# Patient Record
Sex: FEMALE | Race: BLACK OR AFRICAN AMERICAN | NOT HISPANIC OR LATINO | Employment: OTHER | URBAN - METROPOLITAN AREA
[De-identification: names, ages, dates, MRNs, and addresses within clinical notes are randomized per-mention and may not be internally consistent; named-entity substitution may affect disease eponyms.]

---

## 2020-02-20 PROBLEM — I48.92 ATRIAL FLUTTER: Status: ACTIVE | Noted: 2020-02-20

## 2020-02-20 PROBLEM — Z51.81 ENCOUNTER FOR MONITORING SOTALOL THERAPY: Status: ACTIVE | Noted: 2020-02-20

## 2020-02-20 PROBLEM — Z79.899 ENCOUNTER FOR MONITORING SOTALOL THERAPY: Status: ACTIVE | Noted: 2020-02-20

## 2020-02-20 PROBLEM — E78.5 HYPERLIPIDEMIA: Status: ACTIVE | Noted: 2020-02-20

## 2020-02-20 PROBLEM — I10 ESSENTIAL HYPERTENSION: Status: ACTIVE | Noted: 2020-02-20

## 2020-02-20 NOTE — PROGRESS NOTES
Subjective:     HPI    Cardiologist: Charly Pelaez MD, Vickie Campbell NP    I had the pleasure of seeing Rosalina Rendon in consultation at your request for the evaluation of atrial flutter. She is a 69 year old female with a history of HTN, HLD, and CAD status-post CABG in 2011, who was incidentally noted to be in atrial flutter at a 1/2020 cardiology office visit. This prompted a 24 hour Holter monitor, which showed a 100% atrial flutter burden. She was stared on Xarelto and Sotalol around this time. Rates are controlled, and she is relatively asymptomatic with this arrhythmia. An echo was performed earlier today--results are pending.    Ms. Rendon has noted worsening SANCHEZ over the past month.    I reviewed an ECG dated 1/22/2020 which shows atrial flutter with variable AV conduction.    Review of Systems   Constitution: Negative for decreased appetite, malaise/fatigue, weight gain and weight loss.   HENT: Negative for sore throat.    Eyes: Negative for blurred vision.   Cardiovascular: Negative for chest pain, dyspnea on exertion, irregular heartbeat, leg swelling, near-syncope, orthopnea, palpitations, paroxysmal nocturnal dyspnea and syncope.   Respiratory: Negative for shortness of breath.    Skin: Negative for rash.   Musculoskeletal: Negative for arthritis.   Gastrointestinal: Negative for abdominal pain.   Neurological: Negative for focal weakness.   Psychiatric/Behavioral: Negative for altered mental status.        Objective:    Physical Exam   Constitutional: She is oriented to person, place, and time. She appears well-developed and well-nourished. No distress.   HENT:   Head: Normocephalic and atraumatic.   Mouth/Throat: Oropharynx is clear and moist.   Eyes: Pupils are equal, round, and reactive to light. Conjunctivae are normal. No scleral icterus.   Neck: Normal range of motion. Neck supple. No JVD present. No thyromegaly present.   Cardiovascular: Normal rate, regular rhythm, normal heart sounds and  intact distal pulses. Exam reveals no gallop and no friction rub.   No murmur heard.  Pulmonary/Chest: Effort normal and breath sounds normal. No respiratory distress.   Abdominal: Soft. Bowel sounds are normal. She exhibits no distension.   Musculoskeletal: She exhibits no edema.   Neurological: She is alert and oriented to person, place, and time.   Skin: Skin is warm and dry.   Psychiatric: She has a normal mood and affect. Her behavior is normal.   Vitals reviewed.        Assessment:       1. Atrial flutter, unspecified type    2. Encounter for monitoring sotalol therapy    3. Essential hypertension    4. Hyperlipidemia, unspecified hyperlipidemia type         Plan:       In summary, Rosalina Rendon is a 69 year old female with a history of atrial flutter. Her [id number]-GKNp Score is 4 (age, female, HTN, CAD) so she should continue on Xarelto for now.    We discussed the risks, benefits, indications, and alternatives to atrial flutter ablation. After considering her options she has agreed to proceed. Should AFL by atypical at the time of ablation, a cardioversion will be performed.    CARTO. Do not hold Xarelto. IVY--cancel if sinus. Post-ablation will stop Sotalol, and will plan on stopping Xarelto 4 weeks post-ablation.    Thank you for allowing me to participate in the care of this patient. Please do not hesitate to call me with any questions or concerns.

## 2020-02-21 ENCOUNTER — OFFICE VISIT (OUTPATIENT)
Dept: CARDIOLOGY | Facility: CLINIC | Age: 70
End: 2020-02-21
Payer: MEDICARE

## 2020-02-21 DIAGNOSIS — I10 ESSENTIAL HYPERTENSION: ICD-10-CM

## 2020-02-21 DIAGNOSIS — I48.92 ATRIAL FLUTTER, UNSPECIFIED TYPE: ICD-10-CM

## 2020-02-21 DIAGNOSIS — Z51.81 ENCOUNTER FOR MONITORING SOTALOL THERAPY: ICD-10-CM

## 2020-02-21 DIAGNOSIS — E78.5 HYPERLIPIDEMIA, UNSPECIFIED HYPERLIPIDEMIA TYPE: ICD-10-CM

## 2020-02-21 DIAGNOSIS — Z79.899 ENCOUNTER FOR MONITORING SOTALOL THERAPY: ICD-10-CM

## 2020-02-21 PROCEDURE — 99205 PR OFFICE/OUTPT VISIT, NEW, LEVL V, 60-74 MIN: ICD-10-PCS | Mod: ,,, | Performed by: INTERNAL MEDICINE

## 2020-02-21 PROCEDURE — 1159F PR MEDICATION LIST DOCUMENTED IN MEDICAL RECORD: ICD-10-PCS | Mod: ,,, | Performed by: INTERNAL MEDICINE

## 2020-02-21 PROCEDURE — 99205 OFFICE O/P NEW HI 60 MIN: CPT | Mod: ,,, | Performed by: INTERNAL MEDICINE

## 2020-02-21 PROCEDURE — 1159F MED LIST DOCD IN RCRD: CPT | Mod: ,,, | Performed by: INTERNAL MEDICINE

## 2020-02-27 ENCOUNTER — TELEPHONE (OUTPATIENT)
Dept: ELECTROPHYSIOLOGY | Facility: CLINIC | Age: 70
End: 2020-02-27

## 2020-02-27 NOTE — TELEPHONE ENCOUNTER
Returned call to pt. AFL procedure scheduled for 4/29/2020.        ----- Message from Danna Perez MA sent at 2/27/2020  3:34 PM CST -----  Contact: patient call  The patient would like to talk to the nurse about her procedure that was need to be schedule . Please call [phone]. Thank you.

## 2020-02-28 DIAGNOSIS — Z01.89 ENCOUNTER FOR OTHER SPECIFIED SPECIAL EXAMINATIONS: ICD-10-CM

## 2020-02-28 DIAGNOSIS — I49.9 CARDIAC ARRHYTHMIA, UNSPECIFIED CARDIAC ARRHYTHMIA TYPE: ICD-10-CM

## 2020-02-28 DIAGNOSIS — I48.92 ATRIAL FLUTTER, UNSPECIFIED TYPE: Primary | ICD-10-CM

## 2020-02-28 NOTE — PROGRESS NOTES
Patient Instructions  Cardiac Ablation    Pre-Procedure Testin2020 at 10 AM   Pre-procedure labs have been ordered for you at:  [facility]  · Be sure to arrive at your scheduled time.   · You do NOT need to fast for this blood work.       Important Medication Information:    · HOLD (do NOT take) INSULIN on the day of your procedure.  Your last dose should be taken on the evening of 2020.  · Night prior to procedure take INSULIN as directed. If INSULIN taken at bedtime take 1/2 the prescribed dose.   · You may take all other morning medications with a sip of water on the day of your procedure.       Day of Procedure:    2020 at 9 AM  IVY / CARDIAC ABLATION  Please report to Cardiology Waiting Room on the 3rd floor of the Hospital    · Do not eat or drink anything after 12 midnight on the night before your procedure.  · Please do not wear makeup, especially mascara, when arriving for your procedure.  · You will be GOING HOME AFTER YOUR PROCEDURE  · You will need someone to drive you home from the hospital due to anesthesia.   · You are allowed one guest to stay with you if you are scheduled to stay over night.  There is a pull out chair or sofa in each room for your guest to sleep.  · If you wear a CPAP or BIPAP machine for sleep apnea, please be sure to bring your machine with you if you are scheduled to spend the night.        Directions to Cardiology Waiting Room  If you park in the Parking Garage:  Take elevators to the 2nd floor  Walk up ramp and turn right by Gold Elevators  Take elevator to the 3rd floor  Upon exiting the elevator, turn away from the clinic areas  Walk long garcia around to front of hospital to area with windows overlooking [facility]  Check in at Reception Desk  OR  If family is dropping you off:  Have them drop you off at the front of the Hospital  (Near the ER, where all the flags are hung).  Take the E elevators to the 3rd  floor.  Check in at the Reception Desk in the waiting room.      Post Ablation Instructions:     No pushing, pulling, or lifting greater than 5 pounds for ONE week.   No long car rides (greater than 1 hour) for ONE week.  If a long car ride is required, we ask that you stop every hour and walk around for a few minutes.    No driving for 24 hours after procedure due to anesthesia.   No sitting in water for ONE week (this includes baths, pools, and hot tubs).    It is ok to go up and down home stairs as needed.    Your groin site(s) may be tender and have some bruising.  This is normal.  If the area feels hard, or if there is any pain or swelling at the site(s), please call our office IMMEDIATELY at [phone].        Any need to reschedule or cancel procedures, or any questions regarding your procedures should be addressed directly with the Arrhythmia Department Nurses at the following phone number: [phone].

## 2020-04-14 ENCOUNTER — TELEPHONE (OUTPATIENT)
Dept: ELECTROPHYSIOLOGY | Facility: CLINIC | Age: 70
End: 2020-04-14

## 2020-04-14 NOTE — TELEPHONE ENCOUNTER
Spoke with patient regarding rescheduling non-emergent case in accordance with [facility] guidance due to the COVID-19 outbreak. Informed patient our office would be in contact to reschedule once cleared to do so.  Patient verbalized full understanding and will call our office with any questions or concerns in the meantime.

## 2020-05-29 ENCOUNTER — TELEPHONE (OUTPATIENT)
Dept: ELECTROPHYSIOLOGY | Facility: CLINIC | Age: 70
End: 2020-05-29

## 2020-05-29 NOTE — TELEPHONE ENCOUNTER
Attempted to reach pt to discuss rescheduling AFL RFA., no answer.  Left message with number for pt to return call to discuss dates.

## 2020-06-04 ENCOUNTER — TELEPHONE (OUTPATIENT)
Dept: ELECTROPHYSIOLOGY | Facility: CLINIC | Age: 70
End: 2020-06-04

## 2020-06-04 NOTE — TELEPHONE ENCOUNTER
Attempted to reach pt to reschedule afl ablation, no answer. Left voice message with number for pt to return call.

## 2020-06-24 ENCOUNTER — TELEPHONE (OUTPATIENT)
Dept: ELECTROPHYSIOLOGY | Facility: CLINIC | Age: 70
End: 2020-06-24

## 2020-06-24 NOTE — TELEPHONE ENCOUNTER
Pt return call on 6/10/2020 and AFL ablation reschedule for 7/31/2020. Discussed new visitor policy with patient and she agrees to proceed. Pt denies any fever, chills, cough, loss of appetite and nausea at this time.

## 2020-07-01 ENCOUNTER — TELEPHONE (OUTPATIENT)
Dept: ELECTROPHYSIOLOGY | Facility: CLINIC | Age: 70
End: 2020-07-01

## 2020-07-01 DIAGNOSIS — Z01.818 PRE-OP TESTING: ICD-10-CM

## 2020-07-01 RX ORDER — SOTALOL HYDROCHLORIDE 80 MG/1
80 TABLET ORAL 2 TIMES DAILY
Status: ON HOLD | COMMUNITY
Start: 2020-06-26 | End: 2020-10-22 | Stop reason: HOSPADM

## 2020-07-01 RX ORDER — ROSUVASTATIN CALCIUM 40 MG/1
40 TABLET, COATED ORAL DAILY
COMMUNITY
Start: 2020-03-25

## 2020-07-01 RX ORDER — CETIRIZINE HYDROCHLORIDE 10 MG/1
10 TABLET ORAL NIGHTLY
COMMUNITY
Start: 2020-06-26

## 2020-07-01 RX ORDER — RIVAROXABAN 20 MG/1
20 TABLET, FILM COATED ORAL DAILY
Status: ON HOLD | COMMUNITY
Start: 2020-06-26 | End: 2020-10-22 | Stop reason: SDUPTHER

## 2020-07-01 RX ORDER — FUROSEMIDE 40 MG/1
40 TABLET ORAL DAILY
COMMUNITY
Start: 2020-04-24

## 2020-07-01 RX ORDER — INSULIN ASPART 100 [IU]/ML
INJECTION, SOLUTION INTRAVENOUS; SUBCUTANEOUS
COMMUNITY
Start: 2020-05-28

## 2020-07-01 RX ORDER — ASPIRIN 81 MG/1
81 TABLET ORAL DAILY
COMMUNITY
Start: 2014-09-16

## 2020-07-01 RX ORDER — LISINOPRIL 10 MG/1
10 TABLET ORAL DAILY
COMMUNITY
Start: 2020-03-25

## 2020-07-01 RX ORDER — POTASSIUM CHLORIDE 750 MG/1
10 CAPSULE, EXTENDED RELEASE ORAL DAILY
COMMUNITY
Start: 2020-04-24

## 2020-07-01 RX ORDER — INSULIN GLARGINE 100 [IU]/ML
3 INJECTION, SOLUTION SUBCUTANEOUS SEE ADMIN INSTRUCTIONS
COMMUNITY
Start: 2020-06-14

## 2020-07-01 NOTE — PROGRESS NOTES
Patient Instructions  Cardiac Ablation    Pre-Procedure Testin/25/2020- Last dose of Sotalol. Hold 5 days prior to procedure.       2020 at 10 AM   Pre-procedure labs have been ordered for you at:  [facility]  · You do NOT need to fast for this blood work.       Important Medication Information:    · HOLD (do NOT take) INSULIN on the day of your procedure.  Your last dose should be taken on 2020.             -Night prior to procedure if INSULIN taken at bedtime take 1/2 the dose. If Insulin taken with evening meal take as directed    · HOLD (do NOT take) SOTALOL 5 days prior to your procedure.  Your last dose should be taken on 2020.    · You may take all other morning medications with a sip of water on the day of your procedure.       Day of Procedure:    2020 at 9 AM  IVY/ Cardiac Ablation/ Cardioversion  Please report to Cardiology Waiting Room on the 3rd floor of the Hospital    · Do not eat or drink anything after 12 midnight on the night before your procedure.  · Please do not wear makeup, especially mascara, when arriving for your procedure.  · You will be GOING HOME AFTER YOUR PROCEDURE  · You will need someone to drive you home from the hospital due to anesthesia.     Directions to Cardiology Waiting Room  If you park in the Parking Garage:  Take elevators to the 2nd floor  Walk up ramp and turn right by Gold Elevators  Take elevator to the 3rd floor  Upon exiting the elevator, turn away from the clinic areas  Walk long garcia around to front of hospital to area with windows overlooking [facility]  Check in at Reception Desk  OR  If family is dropping you off:  Have them drop you off at the front of the Hospital  (Near the ER, where all the flags are hung).  Take the E elevators to the 3rd floor.  Check in at the Reception Desk in the waiting room.      · All patients/visitors are asked to arrive with a mask and keep the mask on  throughout their visit  · All persons entering will be screened for fever and respiratory symptoms  · Patients may have one adult support person pre and post procedure  · Support person may remain with the patient prior to their procedure and then must wait in a socially distant manner until a member of the medical team provides an update at the conclusion of the procedure  · If you are spending the night, your support person must follow the visitation hours (10am-6pm)  · Your support person should provide the staff with a valid cell phone number so that he or she can receive automated updates        Post Ablation Instructions:     No pushing, pulling, or lifting greater than 5 pounds for ONE week.   No long car rides (greater than 1 hour) for ONE week.  If a long car ride is required, we ask that you stop every hour and walk around for a few minutes.    No driving for 24 hours after procedure due to anesthesia.   No sitting in water for ONE week (this includes baths, pools, and hot tubs).    It is ok to go up and down home stairs as needed.    Your groin site(s) may be tender and have some bruising.  This is normal.  If the area feels hard, or if there is any pain or swelling at the site(s), please call our office IMMEDIATELY at [phone].        Any need to reschedule or cancel procedures, or any questions regarding your procedures should be addressed directly with the Arrhythmia Department Nurses at the following phone number: [phone].

## 2020-07-31 ENCOUNTER — TELEPHONE (OUTPATIENT)
Dept: ELECTROPHYSIOLOGY | Facility: CLINIC | Age: 70
End: 2020-07-31

## 2020-08-20 ENCOUNTER — TELEPHONE (OUTPATIENT)
Dept: ELECTROPHYSIOLOGY | Facility: CLINIC | Age: 70
End: 2020-08-20

## 2020-08-20 NOTE — TELEPHONE ENCOUNTER
Returned call to pt's daughter. Procedure scheduled for 10/22/2020.          ----- Message from Patrick Gant MA sent at 8/20/2020  2:52 PM CDT -----  Regarding: FW: schedule procedure  Contact: patient's daughter Lauren    ----- Message -----  From: Sherley Reyes  Sent: 8/20/2020   2:47 PM CDT  To: Radha Junior Staff  Subject: schedule procedure                               The pt's daughter is calling to schedule her procedure. Please call her back @ [phone]. Thanks, Sherley

## 2020-09-03 DIAGNOSIS — I48.92 ATRIAL FLUTTER, UNSPECIFIED TYPE: Primary | ICD-10-CM

## 2020-09-03 DIAGNOSIS — I49.9 CARDIAC ARRHYTHMIA, UNSPECIFIED CARDIAC ARRHYTHMIA TYPE: ICD-10-CM

## 2020-09-03 DIAGNOSIS — Z01.89 ENCOUNTER FOR OTHER SPECIFIED SPECIAL EXAMINATIONS: ICD-10-CM

## 2020-10-08 DIAGNOSIS — I49.9 CARDIAC ARRHYTHMIA, UNSPECIFIED CARDIAC ARRHYTHMIA TYPE: ICD-10-CM

## 2020-10-08 DIAGNOSIS — I48.92 ATRIAL FLUTTER, UNSPECIFIED TYPE: Primary | ICD-10-CM

## 2020-10-08 NOTE — PROGRESS NOTES
Patient Instructions  Cardiac Ablation    Pre-Procedure Testing:       10/15/2020 at 10:00 AM  Blood Work has been scheduled for you at: [facility]   You do NOT need to fast for this blood work.     10/16/2020- Last dose of SOTALOL prior to procedure.       Important Medication Information:    · HOLD (do NOT take) INSULIN on the day of your procedure.  Your last dose should be taken on 10/21/2020.  · Night prior to procedure if INSULIN taken with evening meal take as directed. If INSULIN taken at bedtime take 1/2 the prescribed dose.  · HOLD (do NOT take) SOTALOL 5 days prior to your procedure.  Your last dose should be taken on 10/16/2020.  · Night prior to procedure take Xarelto as prescribed with evening meal.  · You may take all other morning medications with a sip of water on the day of your procedure.       **Prior to your procedure please check your skin and groin area thoroughly for any signs or symptoms of infection such as rashes, open sores, yeast infection, or heat rashes.  If you have any concerns with your skin you should be evaluated by your Primary Care Provider or an Urgent Care for treatment prior to your procedure. Your procedure will be cancelled if not treated prior to the day of your procedure.**      Day of Procedure:    10/22/2020 at 9:00 AM - IVY / Cardiac Ablation     [facility] - [address] [place] [id number]  Please report to Cardiology Waiting Room on the 3rd floor of the Hospital,     · Do not eat or drink anything after 12 midnight on the night before your procedure.  · Please follow important medication instructions listed above.   · Please do not wear makeup, especially mascara, when arriving for your procedure.  · You will be GOING HOME AFTER YOUR PROCEDURE        You will need someone to drive you home from the hospital due to anesthesia.        Directions to Cardiology Waiting Room  If you park in the Parking Garage:  Take elevators  to the 2nd floor  Walk up ramp and turn right by Gold Elevators  Take elevator to the 3rd floor  Upon exiting the elevator, turn away from the clinic areas  Walk long garcia around to front of hospital to area with windows overlooking [facility]  Check in at Reception Desk  OR  If family is dropping you off:  Have them drop you off at the front of the Hospital  (Near the ER, where all the flags are hung).  Take the E elevators to the 3rd floor.  Check in at the Reception Desk in the waiting room.        · All patients/visitors are asked to arrive with a mask and keep the mask on throughout their visit  · All persons entering will be screened for fever and respiratory symptoms  · Patients may have one adult support person pre and post procedure  · Support person may remain with the patient prior to their procedure and then must wait in a socially distant manner until a member of the medical team provides an update at the conclusion of the procedure  · If you are spending the night, your support person must follow the visitation hours (8am-6pm)  · Your support person should provide the staff with a valid cell phone number so that he or she can receive automated updates      Post Ablation Instructions:     No pushing, pulling, or lifting greater than 5 pounds for ONE week.   No long car rides (greater than 1 hour) for ONE week.  If a long car ride is required, we ask that you stop every hour and walk around for a few minutes.    No driving for 24 hours after procedure due to anesthesia.   No sitting in water for ONE week (this includes baths, pools, and hot tubs).    It is ok to go up and down home stairs as needed.    Your groin site(s) may be tender and have some bruising.  This is normal.  A small lump less than the size of a quarter or a marble is normal and can last a couple of weeks.   If you notice any swelling, bleeding, or increase in the size of this lump please call us at [phone].         Any need to reschedule or cancel procedures, or any questions regarding your procedures should be addressed directly with the Arrhythmia Department Nurses at the following phone number: [phone].

## 2020-10-21 ENCOUNTER — TELEPHONE (OUTPATIENT)
Dept: ELECTROPHYSIOLOGY | Facility: CLINIC | Age: 70
End: 2020-10-21

## 2020-10-21 NOTE — TELEPHONE ENCOUNTER
Spoke to Ms. Rendon    CONFIRMED procedure arrival time of 9AM    Reiterated instructions including:  -Directions to check in desk  -NPO after midnight night prior to procedure  -High importance of HOLDING Insulin day of procedure, Sotalol last dose 10/16   -Confirmed compliance of Xarelto, patient confirms takes with evening meal  -Pre-procedure LABS done, no alerts  -COVID test on admit  -Confirmed no fever, cough, or shortness of breath in the past 30 days  -Confirmed no redness, rash, irritation, or yeast infection to groin area.   -Reviewed current visitor policy    Pt verbalizes understanding of above and appreciates call.

## 2020-10-22 ENCOUNTER — HOSPITAL ENCOUNTER (OUTPATIENT)
Dept: CARDIOLOGY | Facility: HOSPITAL | Age: 70
Discharge: HOME | End: 2020-10-22
Attending: INTERNAL MEDICINE
Payer: MEDICARE

## 2020-10-22 ENCOUNTER — HOSPITAL ENCOUNTER (OUTPATIENT)
Facility: HOSPITAL | Age: 70
Discharge: HOME | End: 2020-10-22
Attending: INTERNAL MEDICINE | Admitting: INTERNAL MEDICINE
Payer: MEDICARE

## 2020-10-22 ENCOUNTER — ANESTHESIA EVENT (OUTPATIENT)
Dept: MEDSURG UNIT | Facility: HOSPITAL | Age: 70
End: 2020-10-22
Payer: MEDICARE

## 2020-10-22 ENCOUNTER — ANESTHESIA (OUTPATIENT)
Dept: MEDSURG UNIT | Facility: HOSPITAL | Age: 70
End: 2020-10-22
Payer: MEDICARE

## 2020-10-22 VITALS
HEIGHT: 62 IN | WEIGHT: 230 LBS | BODY MASS INDEX: 42.33 KG/M2 | DIASTOLIC BLOOD PRESSURE: 100 MMHG | SYSTOLIC BLOOD PRESSURE: 145 MMHG

## 2020-10-22 VITALS
SYSTOLIC BLOOD PRESSURE: 138 MMHG | BODY MASS INDEX: 42.33 KG/M2 | HEART RATE: 63 BPM | OXYGEN SATURATION: 95 % | TEMPERATURE: 98 F | RESPIRATION RATE: 23 BRPM | HEIGHT: 62 IN | WEIGHT: 230 LBS | DIASTOLIC BLOOD PRESSURE: 65 MMHG

## 2020-10-22 DIAGNOSIS — I48.92 ATRIAL FLUTTER, UNSPECIFIED TYPE: ICD-10-CM

## 2020-10-22 DIAGNOSIS — Z01.89 ENCOUNTER FOR OTHER SPECIFIED SPECIAL EXAMINATIONS: ICD-10-CM

## 2020-10-22 DIAGNOSIS — I48.3 TYPICAL ATRIAL FLUTTER: ICD-10-CM

## 2020-10-22 DIAGNOSIS — I49.9 ARRHYTHMIA: ICD-10-CM

## 2020-10-22 LAB
POCT GLUCOSE: 195 MG/DL (ref 70–110)
POCT GLUCOSE: 198 MG/DL (ref 70–110)
POCT GLUCOSE: 214 MG/DL (ref 70–110)
SARS-COV-2 RDRP RESP QL NAA+PROBE: NEGATIVE

## 2020-10-22 PROCEDURE — D9220A PRA ANESTHESIA: ICD-10-PCS | Mod: CRNA,,, | Performed by: STUDENT IN AN ORGANIZED HEALTH CARE EDUCATION/TRAINING PROGRAM

## 2020-10-22 PROCEDURE — 93621 COMP EP EVL L PAC&REC C SINS: CPT | Mod: 26,,, | Performed by: INTERNAL MEDICINE

## 2020-10-22 PROCEDURE — C1732 CATH, EP, DIAG/ABL, 3D/VECT: HCPCS | Performed by: INTERNAL MEDICINE

## 2020-10-22 PROCEDURE — 93005 ELECTROCARDIOGRAM TRACING: CPT | Mod: 59

## 2020-10-22 PROCEDURE — 93320 TRANSESOPHAGEAL ECHO (TEE) (CUPID ONLY): ICD-10-PCS | Mod: 26,,, | Performed by: INTERNAL MEDICINE

## 2020-10-22 PROCEDURE — 93325 TRANSESOPHAGEAL ECHO (TEE) (CUPID ONLY): ICD-10-PCS | Mod: 26,,, | Performed by: INTERNAL MEDICINE

## 2020-10-22 PROCEDURE — 82962 GLUCOSE BLOOD TEST: CPT | Performed by: INTERNAL MEDICINE

## 2020-10-22 PROCEDURE — D9220A PRA ANESTHESIA: ICD-10-PCS | Mod: ANES,,, | Performed by: ANESTHESIOLOGY

## 2020-10-22 PROCEDURE — 27201423 OPTIME MED/SURG SUP & DEVICES STERILE SUPPLY: Performed by: INTERNAL MEDICINE

## 2020-10-22 PROCEDURE — 93325 DOPPLER ECHO COLOR FLOW MAPG: CPT

## 2020-10-22 PROCEDURE — 93613 INTRACARDIAC EPHYS 3D MAPG: CPT | Mod: ,,, | Performed by: INTERNAL MEDICINE

## 2020-10-22 PROCEDURE — D9220A PRA ANESTHESIA: Mod: CRNA,,, | Performed by: STUDENT IN AN ORGANIZED HEALTH CARE EDUCATION/TRAINING PROGRAM

## 2020-10-22 PROCEDURE — 93653 PR ELECTROPHYS EVAL, COMPREHEN, W/SUPRAVENT TACHYCARD TRMT: ICD-10-PCS | Mod: ,,, | Performed by: INTERNAL MEDICINE

## 2020-10-22 PROCEDURE — 93621: ICD-10-PCS | Mod: 26,,, | Performed by: INTERNAL MEDICINE

## 2020-10-22 PROCEDURE — 37000009 HC ANESTHESIA EA ADD 15 MINS: Performed by: INTERNAL MEDICINE

## 2020-10-22 PROCEDURE — 93010 ELECTROCARDIOGRAM REPORT: CPT | Mod: 76,,, | Performed by: INTERNAL MEDICINE

## 2020-10-22 PROCEDURE — 37000008 HC ANESTHESIA 1ST 15 MINUTES: Performed by: INTERNAL MEDICINE

## 2020-10-22 PROCEDURE — 25000003 PHARM REV CODE 250: Performed by: INTERNAL MEDICINE

## 2020-10-22 PROCEDURE — 93613 INTRACARDIAC EPHYS 3D MAPG: CPT | Performed by: INTERNAL MEDICINE

## 2020-10-22 PROCEDURE — 93320 DOPPLER ECHO COMPLETE: CPT | Mod: 26,,, | Performed by: INTERNAL MEDICINE

## 2020-10-22 PROCEDURE — 63600175 PHARM REV CODE 636 W HCPCS: Performed by: INTERNAL MEDICINE

## 2020-10-22 PROCEDURE — A4216 STERILE WATER/SALINE, 10 ML: HCPCS | Performed by: STUDENT IN AN ORGANIZED HEALTH CARE EDUCATION/TRAINING PROGRAM

## 2020-10-22 PROCEDURE — 93653 COMPRE EP EVAL TX SVT: CPT | Performed by: INTERNAL MEDICINE

## 2020-10-22 PROCEDURE — D9220A PRA ANESTHESIA: Mod: ANES,,, | Performed by: ANESTHESIOLOGY

## 2020-10-22 PROCEDURE — C1730 CATH, EP, 19 OR FEW ELECT: HCPCS | Performed by: INTERNAL MEDICINE

## 2020-10-22 PROCEDURE — 25000003 PHARM REV CODE 250: Performed by: STUDENT IN AN ORGANIZED HEALTH CARE EDUCATION/TRAINING PROGRAM

## 2020-10-22 PROCEDURE — 93312 ECHO TRANSESOPHAGEAL: CPT | Mod: 26,,, | Performed by: INTERNAL MEDICINE

## 2020-10-22 PROCEDURE — 93653 COMPRE EP EVAL TX SVT: CPT | Mod: ,,, | Performed by: INTERNAL MEDICINE

## 2020-10-22 PROCEDURE — 63600175 PHARM REV CODE 636 W HCPCS: Performed by: STUDENT IN AN ORGANIZED HEALTH CARE EDUCATION/TRAINING PROGRAM

## 2020-10-22 PROCEDURE — 93613 PR INTRACARD ELECTROPHYS 3-DIMENS MAPPING: ICD-10-PCS | Mod: ,,, | Performed by: INTERNAL MEDICINE

## 2020-10-22 PROCEDURE — 93325 DOPPLER ECHO COLOR FLOW MAPG: CPT | Mod: 26,,, | Performed by: INTERNAL MEDICINE

## 2020-10-22 PROCEDURE — 93010 EKG 12-LEAD: ICD-10-PCS | Mod: ,,, | Performed by: INTERNAL MEDICINE

## 2020-10-22 PROCEDURE — 93312 TRANSESOPHAGEAL ECHO (TEE) (CUPID ONLY): ICD-10-PCS | Mod: 26,,, | Performed by: INTERNAL MEDICINE

## 2020-10-22 PROCEDURE — 93621 COMP EP EVL L PAC&REC C SINS: CPT | Performed by: INTERNAL MEDICINE

## 2020-10-22 PROCEDURE — U0002 COVID-19 LAB TEST NON-CDC: HCPCS

## 2020-10-22 PROCEDURE — C1894 INTRO/SHEATH, NON-LASER: HCPCS | Performed by: INTERNAL MEDICINE

## 2020-10-22 RX ORDER — LIDOCAINE HYDROCHLORIDE 20 MG/ML
INJECTION INTRAVENOUS
Status: DISCONTINUED | OUTPATIENT
Start: 2020-10-22 | End: 2020-10-22

## 2020-10-22 RX ORDER — ACETAMINOPHEN 325 MG/1
650 TABLET ORAL EVERY 4 HOURS PRN
Status: DISCONTINUED | OUTPATIENT
Start: 2020-10-22 | End: 2020-10-22 | Stop reason: HOSPADM

## 2020-10-22 RX ORDER — FENTANYL CITRATE 50 UG/ML
25 INJECTION, SOLUTION INTRAMUSCULAR; INTRAVENOUS EVERY 5 MIN PRN
Status: DISCONTINUED | OUTPATIENT
Start: 2020-10-22 | End: 2020-10-22

## 2020-10-22 RX ORDER — RIVAROXABAN 20 MG/1
20 TABLET, FILM COATED ORAL DAILY
Qty: 30 TABLET | Refills: 0 | Status: SHIPPED | OUTPATIENT
Start: 2020-10-22 | End: 2020-11-21

## 2020-10-22 RX ORDER — SODIUM CHLORIDE 9 MG/ML
INJECTION, SOLUTION INTRAVENOUS CONTINUOUS PRN
Status: DISCONTINUED | OUTPATIENT
Start: 2020-10-22 | End: 2020-10-22

## 2020-10-22 RX ORDER — DIPHENHYDRAMINE HYDROCHLORIDE 50 MG/ML
25 INJECTION INTRAMUSCULAR; INTRAVENOUS EVERY 6 HOURS PRN
Status: DISCONTINUED | OUTPATIENT
Start: 2020-10-22 | End: 2020-10-22 | Stop reason: HOSPADM

## 2020-10-22 RX ORDER — HEPARIN SODIUM 200 [USP'U]/100ML
INJECTION, SOLUTION INTRAVENOUS
Status: DISCONTINUED | OUTPATIENT
Start: 2020-10-22 | End: 2020-10-22 | Stop reason: HOSPADM

## 2020-10-22 RX ORDER — HYDROMORPHONE HYDROCHLORIDE 1 MG/ML
0.2 INJECTION, SOLUTION INTRAMUSCULAR; INTRAVENOUS; SUBCUTANEOUS EVERY 5 MIN PRN
Status: DISCONTINUED | OUTPATIENT
Start: 2020-10-22 | End: 2020-10-22 | Stop reason: HOSPADM

## 2020-10-22 RX ORDER — FENTANYL CITRATE 50 UG/ML
INJECTION, SOLUTION INTRAMUSCULAR; INTRAVENOUS
Status: DISCONTINUED | OUTPATIENT
Start: 2020-10-22 | End: 2020-10-22

## 2020-10-22 RX ORDER — ONDANSETRON 2 MG/ML
4 INJECTION INTRAMUSCULAR; INTRAVENOUS ONCE AS NEEDED
Status: DISCONTINUED | OUTPATIENT
Start: 2020-10-22 | End: 2020-10-22

## 2020-10-22 RX ORDER — DIPHENHYDRAMINE HYDROCHLORIDE 50 MG/ML
25 INJECTION INTRAMUSCULAR; INTRAVENOUS EVERY 6 HOURS PRN
Status: DISCONTINUED | OUTPATIENT
Start: 2020-10-22 | End: 2020-10-22

## 2020-10-22 RX ORDER — PANTOPRAZOLE SODIUM 40 MG/1
40 TABLET, DELAYED RELEASE ORAL DAILY
Qty: 30 TABLET | Refills: 0 | Status: SHIPPED | OUTPATIENT
Start: 2020-10-22 | End: 2020-11-21

## 2020-10-22 RX ORDER — FENTANYL CITRATE 50 UG/ML
25 INJECTION, SOLUTION INTRAMUSCULAR; INTRAVENOUS EVERY 5 MIN PRN
Status: DISCONTINUED | OUTPATIENT
Start: 2020-10-22 | End: 2020-10-22 | Stop reason: HOSPADM

## 2020-10-22 RX ORDER — LIDOCAINE HYDROCHLORIDE 20 MG/ML
INJECTION, SOLUTION INFILTRATION; PERINEURAL
Status: DISCONTINUED | OUTPATIENT
Start: 2020-10-22 | End: 2020-10-22 | Stop reason: HOSPADM

## 2020-10-22 RX ORDER — PHENYLEPHRINE HYDROCHLORIDE 10 MG/ML
INJECTION INTRAVENOUS
Status: DISCONTINUED | OUTPATIENT
Start: 2020-10-22 | End: 2020-10-22

## 2020-10-22 RX ORDER — PROPOFOL 10 MG/ML
VIAL (ML) INTRAVENOUS CONTINUOUS PRN
Status: DISCONTINUED | OUTPATIENT
Start: 2020-10-22 | End: 2020-10-22

## 2020-10-22 RX ORDER — ONDANSETRON 2 MG/ML
4 INJECTION INTRAMUSCULAR; INTRAVENOUS ONCE AS NEEDED
Status: DISCONTINUED | OUTPATIENT
Start: 2020-10-22 | End: 2020-10-22 | Stop reason: HOSPADM

## 2020-10-22 RX ADMIN — SODIUM CHLORIDE: 0.9 INJECTION, SOLUTION INTRAVENOUS at 12:10

## 2020-10-22 RX ADMIN — FENTANYL CITRATE 25 MCG: 50 INJECTION INTRAMUSCULAR; INTRAVENOUS at 01:10

## 2020-10-22 RX ADMIN — DEXMEDETOMIDINE HYDROCHLORIDE 0.4 MCG/KG/HR: 100 INJECTION, SOLUTION, CONCENTRATE INTRAVENOUS at 12:10

## 2020-10-22 RX ADMIN — PHENYLEPHRINE HYDROCHLORIDE 100 MCG: 10 INJECTION, SOLUTION INTRAMUSCULAR; INTRAVENOUS; SUBCUTANEOUS at 01:10

## 2020-10-22 RX ADMIN — PHENYLEPHRINE HYDROCHLORIDE 150 MCG: 10 INJECTION, SOLUTION INTRAMUSCULAR; INTRAVENOUS; SUBCUTANEOUS at 01:10

## 2020-10-22 RX ADMIN — LIDOCAINE HYDROCHLORIDE 40 MG: 20 INJECTION, SOLUTION INTRAVENOUS at 12:10

## 2020-10-22 RX ADMIN — FENTANYL CITRATE 25 MCG: 50 INJECTION INTRAMUSCULAR; INTRAVENOUS at 12:10

## 2020-10-22 RX ADMIN — PROPOFOL 50 MCG/KG/MIN: 10 INJECTION, EMULSION INTRAVENOUS at 12:10

## 2020-10-22 RX ADMIN — PHENYLEPHRINE HYDROCHLORIDE 50 MCG: 10 INJECTION, SOLUTION INTRAMUSCULAR; INTRAVENOUS; SUBCUTANEOUS at 01:10

## 2020-10-22 NOTE — CONSULTS
[facility] Stay Cardiac Unit  Cardiology  Consult Note    Patient Name: Rosalina Rendon  MRN: [id number]  Admission Date: 10/22/2020  Hospital Length of Stay: 0 days  Code Status: No Order   Attending Provider: Vernon Garcia MD   Consulting Provider: Brody Chavez MD  Primary Care Physician: TINA Sparks  Principal Problem:<principal problem not specified>    Patient information was obtained from patient and relative(s).     Consults  Subjective:     Chief Complaint:  AFL     HPI:   She is a 69 year old female with a history of HTN, HLD, and CAD status-post CABG in 2011, who was incidentally noted to be in atrial flutter at a 1/2020 cardiology office visit. This prompted a 24 hour Holter monitor, which showed a 100% atrial flutter burden. She was stared on Xarelto and Sotalol around this time. No echo on file. Here today for CTI ablation with Dr. Garcia.    ECG: atrial flutter with variable block    Dysphagia or odynophagia:  No  Liver Disease, esophageal disease, or known varices:  No  Upper GI Bleeding: No  Snoring:  No  Sleep Apnea:  No  Prior neck surgery or radiation:  No  History of anesthetic difficulties:  No  Family history of anesthetic difficulties:  No  Last oral intake:  last night before midnight  Able to move neck in all directions:  Yes  Anticoagulation/Antiplatelets: Xarelto  Platelet count: pending  INR: pending      Past Medical History:   Diagnosis Date    Anticoagulant long-term use     Cancer     Diabetes mellitus     Encounter for blood transfusion     Hypertension        Past Surgical History:   Procedure Laterality Date    BREAST SURGERY      CHOLECYSTECTOMY      CORONARY ARTERY BYPASS GRAFT         Review of patient's allergies indicates:   Allergen Reactions    Codeine Hives       No current facility-administered medications on file prior to encounter.      Current Outpatient Medications on File Prior to Encounter   Medication Sig    aspirin (ECOTRIN)  81 MG EC tablet Take 81 mg by mouth once daily.    cetirizine (ZYRTEC) 10 MG tablet Take 10 mg by mouth every evening.    furosemide (LASIX) 40 MG tablet Take 40 mg by mouth once daily.    lisinopriL 10 MG tablet Take 10 mg by mouth once daily.    NOVOLOG U-100 INSULIN ASPART 100 unit/mL injection INJECT 12 UNITS SUBCUTANEOUSLY THREE TIMES DAILY WITH MEALS    potassium chloride (MICRO-K) 10 MEQ CpSR Take 10 mEq by mouth once daily.    rosuvastatin (CRESTOR) 40 MG Tab Take 40 mg by mouth once daily.    XARELTO 20 mg Tab Take 20 mg by mouth once daily. With evening meal    BASAGLAR KWIKPEN U-100 INSULIN glargine 100 units/mL (3mL) SubQ pen Inject 3 mLs into the skin As instructed.    sotaloL (BETAPACE) 80 MG tablet Take 80 mg by mouth 2 (two) times a day.     Family History     None        Tobacco Use    Smoking status: Never Smoker   Substance and Sexual Activity    Alcohol use: Not Currently     Frequency: Never    Drug use: Not Currently    Sexual activity: Not on file     Review of Systems   All other systems reviewed and are negative.    Objective:     Vital Signs (Most Recent):    Vital Signs (24h Range):           There is no height or weight on file to calculate BMI.            No intake or output data in the 24 hours ending 10/22/20 0943    Lines/Drains/Airways     None                 Physical Exam   Constitutional: She appears well-developed and well-nourished. No distress.   HENT:   Head: Normocephalic and atraumatic.   Eyes: EOM are normal. Right eye exhibits no discharge. Left eye exhibits no discharge. No scleral icterus.   Neck: Normal range of motion. Neck supple.   Cardiovascular: Normal rate, S1 normal, S2 normal, normal heart sounds, intact distal pulses and normal pulses. An irregularly irregular rhythm present.  No extrasystoles are present. Exam reveals no gallop, no S3, no S4, no distant heart sounds, no friction rub and no opening snap.   No murmur heard.  Pulses:       Radial  pulses are 2+ on the right side and 2+ on the left side.   Pulmonary/Chest: Effort normal. No respiratory distress. She has no wheezes. She has no rales.   Abdominal: Soft. Bowel sounds are normal. She exhibits no distension. There is no abdominal tenderness.   Musculoskeletal: Normal range of motion.         General: No deformity or edema.   Neurological: She is alert.   Skin: Skin is warm and dry. No rash noted. She is not diaphoretic. No erythema.   Nursing note and vitals reviewed.      Significant Labs: All pertinent lab results from the last 24 hours have been reviewed.    Significant Imaging: Reviewed    Assessment and Plan:     Atrial flutter  Here today for IVY prior to CTI with Dr. Garcia  -No absolute contraindications of esophageal stricture, tumor, perforation, laceration,or diverticulum and/or active GI bleed  -The risks, benefits & alternatives of the procedure were explained to the patient.   -The risks of transesophageal echo include but are not limited to:  Dental trauma, esophageal trauma/perforation, bleeding, laryngospasm/brochospasm, aspiration, sore throat/hoarseness, & dislodgement of the endotracheal tube/nasogastric tube (where applicable).    -The risks of ANES monitored sedation include hypotension, respiratory depression, arrhythmias, bronchospasm, & death.    -Informed consent was obtained. The patient is agreeable to proceed with the procedure and all questions and concerns addressed.    Case discussed with an attending in echocardiography lab.    Further recommendations per attending addendum          VTE Risk Mitigation (From admission, onward)    None          Thank you for your consult. I will follow-up with patient. Please contact us if you have any additional questions.    Brody Chavez MD  Cardiology   [facility]

## 2020-10-22 NOTE — SUBJECTIVE & OBJECTIVE
Past Medical History:   Diagnosis Date    Anticoagulant long-term use     Cancer     Diabetes mellitus     Encounter for blood transfusion     Hypertension        Past Surgical History:   Procedure Laterality Date    BREAST SURGERY      CHOLECYSTECTOMY      CORONARY ARTERY BYPASS GRAFT         Review of patient's allergies indicates:   Allergen Reactions    Codeine Hives       No current facility-administered medications on file prior to encounter.      Current Outpatient Medications on File Prior to Encounter   Medication Sig    aspirin (ECOTRIN) 81 MG EC tablet Take 81 mg by mouth once daily.    cetirizine (ZYRTEC) 10 MG tablet Take 10 mg by mouth every evening.    furosemide (LASIX) 40 MG tablet Take 40 mg by mouth once daily.    lisinopriL 10 MG tablet Take 10 mg by mouth once daily.    NOVOLOG U-100 INSULIN ASPART 100 unit/mL injection INJECT 12 UNITS SUBCUTANEOUSLY THREE TIMES DAILY WITH MEALS    potassium chloride (MICRO-K) 10 MEQ CpSR Take 10 mEq by mouth once daily.    rosuvastatin (CRESTOR) 40 MG Tab Take 40 mg by mouth once daily.    XARELTO 20 mg Tab Take 20 mg by mouth once daily. With evening meal    BASAGLAR KWIKPEN U-100 INSULIN glargine 100 units/mL (3mL) SubQ pen Inject 3 mLs into the skin As instructed.    sotaloL (BETAPACE) 80 MG tablet Take 80 mg by mouth 2 (two) times a day.     Family History     None        Tobacco Use    Smoking status: Never Smoker   Substance and Sexual Activity    Alcohol use: Not Currently     Frequency: Never    Drug use: Not Currently    Sexual activity: Not on file     Review of Systems   All other systems reviewed and are negative.    Objective:     Vital Signs (Most Recent):    Vital Signs (24h Range):           There is no height or weight on file to calculate BMI.            No intake or output data in the 24 hours ending 10/22/20 0943    Lines/Drains/Airways     None                 Physical Exam   Constitutional: She appears well-developed  and well-nourished. No distress.   HENT:   Head: Normocephalic and atraumatic.   Eyes: EOM are normal. Right eye exhibits no discharge. Left eye exhibits no discharge. No scleral icterus.   Neck: Normal range of motion. Neck supple.   Cardiovascular: Normal rate, S1 normal, S2 normal, normal heart sounds, intact distal pulses and normal pulses. An irregularly irregular rhythm present.  No extrasystoles are present. Exam reveals no gallop, no S3, no S4, no distant heart sounds, no friction rub and no opening snap.   No murmur heard.  Pulses:       Radial pulses are 2+ on the right side and 2+ on the left side.   Pulmonary/Chest: Effort normal. No respiratory distress. She has no wheezes. She has no rales.   Abdominal: Soft. Bowel sounds are normal. She exhibits no distension. There is no abdominal tenderness.   Musculoskeletal: Normal range of motion.         General: No deformity or edema.   Neurological: She is alert.   Skin: Skin is warm and dry. No rash noted. She is not diaphoretic. No erythema.   Nursing note and vitals reviewed.      Significant Labs: All pertinent lab results from the last 24 hours have been reviewed.    Significant Imaging: Reviewed

## 2020-10-22 NOTE — H&P
[facility]  Cardiac Electrophysiology  History and Physical     Admission Date: 10/22/2020  Code Status: No Order   Attending Provider: Vernon Garcia MD   Principal Problem:<principal problem not specified>    Subjective:     Chief Complaint:  Atrial flutter     HPI:  69 year old female with a history of HTN, HLD, and CAD status-post CABG in 2011, and atrial flutter, here for AFl ablation w Dr. Garcia. Per last clinic note, she was incidentally noted to be in atrial flutter at a 1/2020 cardiology office visit. This prompted a 24 hour Holter monitor, which showed a 100% atrial flutter burden. She was stared on Xarelto and Sotalol around this time. Rates are controlled, and she is relatively asymptomatic with this arrhythmia. An echo was performed earlier today--results are still pending. ECG dated 1/22/2020 atrial flutter with variable AV conduction.  Update: Feeling well, Denies chest pain, palpitations or shortness of breath. Planned for atrial flutter ablation, she agrees to proceed as planned. Has continued her xarelto as instructed.      Past Medical History:   Diagnosis Date    Anticoagulant long-term use     Cancer     Diabetes mellitus     Encounter for blood transfusion     Hypertension        Past Surgical History:   Procedure Laterality Date    BREAST SURGERY      CHOLECYSTECTOMY      CORONARY ARTERY BYPASS GRAFT         Review of patient's allergies indicates:   Allergen Reactions    Codeine Hives       No current facility-administered medications on file prior to encounter.      Current Outpatient Medications on File Prior to Encounter   Medication Sig    aspirin (ECOTRIN) 81 MG EC tablet Take 81 mg by mouth once daily.    cetirizine (ZYRTEC) 10 MG tablet Take 10 mg by mouth every evening.    furosemide (LASIX) 40 MG tablet Take 40 mg by mouth once daily.    lisinopriL 10 MG tablet Take 10 mg by mouth once daily.    NOVOLOG U-100 INSULIN ASPART 100  unit/mL injection INJECT 12 UNITS SUBCUTANEOUSLY THREE TIMES DAILY WITH MEALS    potassium chloride (MICRO-K) 10 MEQ CpSR Take 10 mEq by mouth once daily.    rosuvastatin (CRESTOR) 40 MG Tab Take 40 mg by mouth once daily.    XARELTO 20 mg Tab Take 20 mg by mouth once daily. With evening meal    BASAGLAR KWIKPEN U-100 INSULIN glargine 100 units/mL (3mL) SubQ pen Inject 3 mLs into the skin As instructed.    sotaloL (BETAPACE) 80 MG tablet Take 80 mg by mouth 2 (two) times a day.     Family History     None        Tobacco Use    Smoking status: Never Smoker   Substance and Sexual Activity    Alcohol use: Not Currently     Frequency: Never    Drug use: Not Currently    Sexual activity: Not on file     Review of Systems   Constitution: Negative. Negative for chills and fever.   HENT: Negative.    Eyes: Negative.    Cardiovascular: Negative for chest pain, claudication and paroxysmal nocturnal dyspnea.   Respiratory: Negative for cough, shortness of breath and wheezing.    Endocrine: Negative.    Hematologic/Lymphatic: Does not bruise/bleed easily.   Skin: Negative for nail changes and rash.   Musculoskeletal: Negative.  Negative for back pain.   Gastrointestinal: Negative for abdominal pain, melena, nausea and vomiting.   Neurological: Negative for dizziness and headaches.   Psychiatric/Behavioral: Negative for altered mental status, depression and substance abuse.   Allergic/Immunologic: Negative.      Objective:     Vital Signs (Most Recent):  Temp: 99.4 °F (37.4 °C) (10/22/20 0939)  Pulse: 82 (10/22/20 0939)  Resp: 18 (10/22/20 0939)  BP: (!) 142/70 (10/22/20 0946)  SpO2: (!) 93 % (10/22/20 0946) Vital Signs (24h Range):  Temp:  [99.4 °F (37.4 °C)] 99.4 °F (37.4 °C)  Pulse:  [82] 82  Resp:  [18] 18  SpO2:  [93 %] 93 %  BP: (137-142)/() 142/70       Weight: 104.3 kg (230 lb)  Body mass index is 42.07 kg/m².    SpO2: (!) 93 %       Physical Exam   Constitutional: She is oriented to person, place, and  time. She appears well-developed and well-nourished.   HENT:   Head: Normocephalic and atraumatic.   Eyes: Pupils are equal, round, and reactive to light. Conjunctivae and EOM are normal.   Neck: No JVD present.   Cardiovascular: Normal rate, regular rhythm, normal heart sounds and intact distal pulses. Exam reveals no gallop and no friction rub.   No murmur heard.  Pulmonary/Chest: Effort normal. No stridor. She has no wheezes. She has no rales. She exhibits no tenderness.   Abdominal: Soft. Bowel sounds are normal. She exhibits no distension and no mass. There is no abdominal tenderness. There is no guarding.   Musculoskeletal:         General: No tenderness, deformity or edema.   Neurological: She is alert and oriented to person, place, and time.   Skin: Skin is warm and dry. No rash noted. No erythema.   Psychiatric: She has a normal mood and affect.       Significant Labs: CMP: No results for input(s): NA, K, CL, CO2, GLU, BUN, CREATININE, CALCIUM, PROT, ALBUMIN, BILITOT, ALKPHOS, AST, ALT, ANIONGAP, ESTGFRAFRICA, EGFRNONAA in the last 48 hours. and CBC: No results for input(s): WBC, HGB, HCT, PLT in the last 48 hours.    Significant Imaging: Echocardiogram: Transthoracic echo (TTE) complete (Cupid Only): No results found for this or any previous visit.    Assessment and Plan:     Atrial flutter  - We discussed the alternatives, benefits and risks of the procedure including pain, infection, bleeding, injury to veins and arteries including aneurysms and fistulas, injury to heart valves, puncture of the heart leading to pericardial effusion or tamponade requiring tube drainage, atrial-esophageal fistula, heart attack, migraine headaches secondary to transseptal puncture, damage to the native conduction system such that a pacemaker may be required, stroke, and death.  - Agrees to proceed as planned  - Rosalina Rendon is a 69 year old female with a history of atrial flutter. Her [id number] Score is 4 (age, female,  HTN, CAD) so she should continue on Xarelto for now.  - Will continue Xarelto  - IVY--cancel if sinus.  - Post-ablation will stop Sotalol, and will plan on stopping Xarelto 4 weeks post-ablation.        Mariusz Cazares MD  Cardiac Electrophysiology  [facility]

## 2020-10-22 NOTE — ASSESSMENT & PLAN NOTE
Here today for IVY prior to CTI with Dr. Garcia  -No absolute contraindications of esophageal stricture, tumor, perforation, laceration,or diverticulum and/or active GI bleed  -The risks, benefits & alternatives of the procedure were explained to the patient.   -The risks of transesophageal echo include but are not limited to:  Dental trauma, esophageal trauma/perforation, bleeding, laryngospasm/brochospasm, aspiration, sore throat/hoarseness, & dislodgement of the endotracheal tube/nasogastric tube (where applicable).    -The risks of ANES monitored sedation include hypotension, respiratory depression, arrhythmias, bronchospasm, & death.    -Informed consent was obtained. The patient is agreeable to proceed with the procedure and all questions and concerns addressed.    Case discussed with an attending in echocardiography lab.    Further recommendations per attending addendum

## 2020-10-22 NOTE — HPI
She is a 69 year old female with a history of HTN, HLD, and CAD status-post CABG in 2011, who was incidentally noted to be in atrial flutter at a 1/2020 cardiology office visit. This prompted a 24 hour Holter monitor, which showed a 100% atrial flutter burden. She was stared on Xarelto and Sotalol around this time. No echo on file. Here today for CTI ablation with Dr. Garcia.    ECG: atrial flutter with variable block    Dysphagia or odynophagia:  No  Liver Disease, esophageal disease, or known varices:  No  Upper GI Bleeding: No  Snoring:  No  Sleep Apnea:  No  Prior neck surgery or radiation:  No  History of anesthetic difficulties:  No  Family history of anesthetic difficulties:  No  Last oral intake:  last night before midnight  Able to move neck in all directions:  Yes  Anticoagulation/Antiplatelets: Xarelto  Platelet count: pending  INR: pending

## 2020-10-22 NOTE — TRANSFER OF CARE
"Anesthesia Transfer of Care Note    Patient: Rosalina Rendon    Procedure(s) Performed: Procedure(s) (LRB):  ABLATION, ATRIAL FLUTTER, TYPICAL (N/A)  ECHOCARDIOGRAM, TRANSESOPHAGEAL (N/A)    Patient location: PACU    Anesthesia Type: general    Transport from OR: Transported from OR on 6-10 L/min O2 by face mask with adequate spontaneous ventilation    Post pain: adequate analgesia    Post assessment: no apparent anesthetic complications and tolerated procedure well    Post vital signs: stable    Level of consciousness: responds to stimulation and awake    Nausea/Vomiting: no nausea/vomiting    Complications: none    Transfer of care protocol was followed      Last vitals:   Visit Vitals  BP (!) 142/70 (BP Location: Left arm, Patient Position: Lying)   Pulse 82   Temp 37.4 °C (99.4 °F) (Oral)   Resp 18   Ht 5' 2" (1.575 m)   Wt 104.3 kg (230 lb)   SpO2 (!) 93%   Breastfeeding No   BMI 42.07 kg/m²     "

## 2020-10-22 NOTE — DISCHARGE INSTRUCTIONS
Discharge Instructions   · Continue taking your blood thinner, Xarelto 20 mg with your evening meal. Continue taking for the next 4 weeks, then stop on 11/21/20.

## 2020-10-22 NOTE — PLAN OF CARE
Report received from VAL Santos. patient is aaox3. Vss. Right groin with dry gauze/tegaderm intact. No bleeding noted. Will monitor bedrest continued. Daughter notified. Tele monitor on . Call light within reach.

## 2020-10-22 NOTE — HPI
69 year old female with a history of HTN, HLD, and CAD status-post CABG in 2011, and atrial flutter, here for AFl ablation w Dr. Garcia. Per last clinic note, she was incidentally noted to be in atrial flutter at a 1/2020 cardiology office visit. This prompted a 24 hour Holter monitor, which showed a 100% atrial flutter burden. She was stared on Xarelto and Sotalol around this time. Rates are controlled, and she is relatively asymptomatic with this arrhythmia. An echo was performed earlier today--results are still pending. ECG dated 1/22/2020 atrial flutter with variable AV conduction.    Update: Feeling well, Denies chest pain, palpitations or shortness of breath. Planned for atrial flutter ablation, she agrees to proceed as planned. Has continued her xarelto as instructed.

## 2020-10-22 NOTE — PLAN OF CARE
Patient aaox3. Vss. Right groin site with dry gauze/dressing intact. No bleeding noted. No hematoma. Patient denies any discomfort. patient has completed bedrest protocol post ablation. ambulated and voided without any complications. Iv heplock and tele monitor removed. AVS printed and reviewed with patient and daughter, Lauren, reminded patient to  [facility] Wheelchair provided for discharge.

## 2020-10-22 NOTE — SUBJECTIVE & OBJECTIVE
Past Medical History:   Diagnosis Date    Anticoagulant long-term use     Cancer     Diabetes mellitus     Encounter for blood transfusion     Hypertension        Past Surgical History:   Procedure Laterality Date    BREAST SURGERY      CHOLECYSTECTOMY      CORONARY ARTERY BYPASS GRAFT         Review of patient's allergies indicates:   Allergen Reactions    Codeine Hives       No current facility-administered medications on file prior to encounter.      Current Outpatient Medications on File Prior to Encounter   Medication Sig    aspirin (ECOTRIN) 81 MG EC tablet Take 81 mg by mouth once daily.    cetirizine (ZYRTEC) 10 MG tablet Take 10 mg by mouth every evening.    furosemide (LASIX) 40 MG tablet Take 40 mg by mouth once daily.    lisinopriL 10 MG tablet Take 10 mg by mouth once daily.    NOVOLOG U-100 INSULIN ASPART 100 unit/mL injection INJECT 12 UNITS SUBCUTANEOUSLY THREE TIMES DAILY WITH MEALS    potassium chloride (MICRO-K) 10 MEQ CpSR Take 10 mEq by mouth once daily.    rosuvastatin (CRESTOR) 40 MG Tab Take 40 mg by mouth once daily.    XARELTO 20 mg Tab Take 20 mg by mouth once daily. With evening meal    BASAGLAR KWIKPEN U-100 INSULIN glargine 100 units/mL (3mL) SubQ pen Inject 3 mLs into the skin As instructed.    sotaloL (BETAPACE) 80 MG tablet Take 80 mg by mouth 2 (two) times a day.     Family History     None        Tobacco Use    Smoking status: Never Smoker   Substance and Sexual Activity    Alcohol use: Not Currently     Frequency: Never    Drug use: Not Currently    Sexual activity: Not on file     Review of Systems   Constitution: Negative. Negative for chills and fever.   HENT: Negative.    Eyes: Negative.    Cardiovascular: Negative for chest pain, claudication and paroxysmal nocturnal dyspnea.   Respiratory: Negative for cough, shortness of breath and wheezing.    Endocrine: Negative.    Hematologic/Lymphatic: Does not bruise/bleed easily.   Skin: Negative for nail  changes and rash.   Musculoskeletal: Negative.  Negative for back pain.   Gastrointestinal: Negative for abdominal pain, melena, nausea and vomiting.   Neurological: Negative for dizziness and headaches.   Psychiatric/Behavioral: Negative for altered mental status, depression and substance abuse.   Allergic/Immunologic: Negative.      Objective:     Vital Signs (Most Recent):  Temp: 99.4 °F (37.4 °C) (10/22/20 0939)  Pulse: 82 (10/22/20 0939)  Resp: 18 (10/22/20 0939)  BP: (!) 142/70 (10/22/20 0946)  SpO2: (!) 93 % (10/22/20 0946) Vital Signs (24h Range):  Temp:  [99.4 °F (37.4 °C)] 99.4 °F (37.4 °C)  Pulse:  [82] 82  Resp:  [18] 18  SpO2:  [93 %] 93 %  BP: (137-142)/() 142/70       Weight: 104.3 kg (230 lb)  Body mass index is 42.07 kg/m².    SpO2: (!) 93 %       Physical Exam   Constitutional: She is oriented to person, place, and time. She appears well-developed and well-nourished.   HENT:   Head: Normocephalic and atraumatic.   Eyes: Pupils are equal, round, and reactive to light. Conjunctivae and EOM are normal.   Neck: No JVD present.   Cardiovascular: Normal rate, regular rhythm, normal heart sounds and intact distal pulses. Exam reveals no gallop and no friction rub.   No murmur heard.  Pulmonary/Chest: Effort normal. No stridor. She has no wheezes. She has no rales. She exhibits no tenderness.   Abdominal: Soft. Bowel sounds are normal. She exhibits no distension and no mass. There is no abdominal tenderness. There is no guarding.   Musculoskeletal:         General: No tenderness, deformity or edema.   Neurological: She is alert and oriented to person, place, and time.   Skin: Skin is warm and dry. No rash noted. No erythema.   Psychiatric: She has a normal mood and affect.       Significant Labs: CMP: No results for input(s): NA, K, CL, CO2, GLU, BUN, CREATININE, CALCIUM, PROT, ALBUMIN, BILITOT, ALKPHOS, AST, ALT, ANIONGAP, ESTGFRAFRICA, EGFRNONAA in the last 48 hours. and CBC: No results for  input(s): WBC, HGB, HCT, PLT in the last 48 hours.    Significant Imaging: Echocardiogram: Transthoracic echo (TTE) complete (Cupid Only): No results found for this or any previous visit.

## 2020-10-22 NOTE — DISCHARGE SUMMARY
[facility] - Short Stay Cardiac Unit  Cardiac Electrophysiology  Discharge Summary      Patient Name: Rosalina Rendon  MRN: [id number]  Admission Date: 10/22/2020  Hospital Length of Stay: 0 days  Discharge Date and Time:  10/22/2020 4:46 PM  Attending Physician: eVrnon Garcia MD    Discharging Provider: Delia Quiroz NP  Primary Care Physician: TINA Sparks    HPI:   69 year old female with a history of HTN, HLD, and CAD status-post CABG in 2011, and atrial flutter, here for AFl ablation w Dr. Garcia. Per last clinic note, she was incidentally noted to be in atrial flutter at a 1/2020 cardiology office visit. This prompted a 24 hour Holter monitor, which showed a 100% atrial flutter burden. She was stared on Xarelto and Sotalol around this time. Rates are controlled, and she is relatively asymptomatic with this arrhythmia. An echo was performed earlier today--results are still pending. ECG dated 1/22/2020 atrial flutter with variable AV conduction.  Update: Feeling well, Denies chest pain, palpitations or shortness of breath. Planned for atrial flutter ablation, she agrees to proceed as planned. Has continued her xarelto as instructed.      Procedure(s) (LRB):  ABLATION, ATRIAL FLUTTER, TYPICAL (N/A)  ECHOCARDIOGRAM, TRANSESOPHAGEAL (N/A)     Indwelling Lines/Drains at time of discharge:  Lines/Drains/Airways     None                 Hospital Course:  Ms. Rendon underwent IVY and is s/p CTI line ablation.  She tolerated the procedure well without any acute complications. Admitted to PACU and post-procedure ECG sinus rhythm with RBBB. Right groin site intact and without evidence of hematoma. ompleted bedrest x 4 hours and ambulated without difficulty.  She was monitored on telemetry and was without any acute arrhythmias. Resumed PTA medications, including Xarelto for CVA prophylaxis x 4 weeks. Plan to stop sotalol.   Plan of care and discharge instructions discussed with Ms. Rendon and her  daughter.  All questions were answered and she was discharged home in stable condition.       Consults: Anesthesia     Pending Diagnostic Studies:     None          Final Active Diagnoses:    Diagnosis Date Noted POA    Atrial flutter [I48.92] 02/20/2020 Yes      Problems Resolved During this Admission:     Atrial flutter  Ms. Rendon is s/p CTI line ablation.     Plan as follows:   - Pain relief with Tylenol 650 mg q4h PRN   - Anticoagulation: continue Xarelto 20 mg x 4 weeks, then discontinue   - Anti-arrythmic: stop Sotalol    -Protonix 40 mg daily x 4 weeks     - Follow up with Dr. Garcia in 6-8 weeks   - Discharge plans/instructions discussed with patient who verbalized understanding and agreement of plans of care. No further questions or concerns voiced at this time.       Discharged Condition: good    Disposition: Home or Self Care    Follow Up:  Follow-up Information     Vernon Garcia MD In 6 weeks.    Specialties: Electrophysiology, Cardiovascular Disease, Cardiology  Why: s/p AFL/CTI    Contact information:  [address] [place] [id number]  [phone]                 Patient Instructions:      Diet Adult Regular     Other restrictions (specify):   Order Comments: Restrictions  1. Do not strain or lift anything greater than 10 lb for 1 week.   2. Do not drive or operate any dangerous machinery for 24 hours.    3. After 24 hours, a shower is allowed.   4. Clean the area with mild soap and water.   5. Once the skin has healed (1 week), bathing in a tub, swimming, or hot tub is allowed.   6. Inspect the groin site daily and report to the physician any signs of infection at the site: redness, pain, fever >100.4, unusual pain at the access site or affected extremity, unusual swelling at the access site, or any yellow, white or green drainage.     Seek immediate medical attention:  Bleeding from the puncture site that you cannot stop by doing the following:   Relax and lie down right away. Keep your  leg flat and apply firm pressure to the site using your fingers and a gauze pad. Keep the pressure on for 10 minutes. Continue this until the bleeding stops. This may take awhile. When bleeding stops, cover the site with a sterile bandage and keep your leg still as much as possible.     Go to the Emergency Department if you develop:   -Severe bleeding   -Acute Weakness or numbness   -Visual, gait or speech disturbances   -New chest pain, palpitations, shortness of breath, fainting       Any need to reschedule appointments, or any questions regarding your procedures should be addressed directly with the Arrhythmia Department Nurses at the following phone number: [phone].     Notify your health care provider if you experience any of the following:  temperature >100.4     Notify your health care provider if you experience any of the following:  difficulty breathing or increased cough     Notify your health care provider if you experience any of the following:  persistent dizziness, light-headedness, or visual disturbances     Activity as tolerated       Medications:  Reconciled Home Medications:      Medication List      START taking these medications    pantoprazole 40 MG tablet  Commonly known as: PROTONIX  Take 1 tablet (40 mg total) by mouth once daily. Take for 30 days post-ablation, then discontinue. No refills.        CHANGE how you take these medications    XARELTO 20 mg Tab  Generic drug: rivaroxaban  Take 1 tablet (20 mg total) by mouth once daily. With evening meal.  Take post-ablation x 4 weeks, then discontinue.  What changed: additional instructions        CONTINUE taking these medications    aspirin 81 MG EC tablet  Commonly known as: ECOTRIN  Take 81 mg by mouth once daily.     BASAGLAR KWIKPEN U-100 INSULIN glargine 100 units/mL (3mL) SubQ pen  Generic drug: insulin  Inject 3 mLs into the skin As instructed.     cetirizine 10 MG tablet  Commonly known as: ZYRTEC  Take 10 mg by mouth every  evening.     furosemide 40 MG tablet  Commonly known as: LASIX  Take 40 mg by mouth once daily.     lisinopriL 10 MG tablet  Take 10 mg by mouth once daily.     NovoLOG U-100 Insulin aspart 100 unit/mL injection  Generic drug: insulin aspart U-100  INJECT 12 UNITS SUBCUTANEOUSLY THREE TIMES DAILY WITH MEALS     potassium chloride 10 MEQ Cpsr  Commonly known as: MICRO-K  Take 10 mEq by mouth once daily.     rosuvastatin 40 MG Tab  Commonly known as: CRESTOR  Take 40 mg by mouth once daily.        STOP taking these medications    sotaloL 80 MG tablet  Commonly known as: BETAPACE            Time spent on the discharge of patient: 30 minutes    Delia Quiroz NP  Cardiac Electrophysiology  [facility] Cardiac [facility]

## 2020-10-22 NOTE — ANESTHESIA PREPROCEDURE EVALUATION
10/22/2020  Rosalina Rendon is a 69 y.o., female.  Patient Active Problem List   Diagnosis    Atrial flutter    Encounter for monitoring sotalol therapy    Essential hypertension    Hyperlipidemia         Anesthesia Evaluation    I have reviewed the Patient Summary Reports.       I have reviewed the Medications.     Review of Systems  Anesthesia Hx:  Denies Family Hx of Anesthesia complications.   Denies Personal Hx of Anesthesia complications.       Physical Exam   Airway/Jaw/Neck:  Airway Findings: Mouth Opening: Normal Tongue: Normal  General Airway Assessment: Adult  Mallampati: II  Improves to II with phonation.  TM Distance: Normal, at least 6 cm      Dental:  No active dental problems.    Chest/Lungs:  Chest/Lungs Findings: Tachypnea, Decreased Breath Sounds Bilateral     Heart/Vascular:  Heart Findings: Rate: Normal  Rhythm: Regular Rhythm  Sounds: Normal        Mental Status:  Mental Status Findings:  Cooperative, Alert and Oriented         Anesthesia Plan  Type of Anesthesia, risks & benefits discussed:  Anesthesia Type:  general  Patient's Preference:   Intra-op Monitoring Plan: standard ASA monitors  Intra-op Monitoring Plan Comments:   Post Op Pain Control Plan:   Post Op Pain Control Plan Comments:   Induction:   IV  Beta Blocker:         Informed Consent: Patient understands risks and agrees with Anesthesia plan.  Questions answered. Anesthesia consent signed with patient.  ASA Score: 4     Day of Surgery Review of History & Physical:        Anesthesia Plan Notes: Chart reviewed, patient interviewed and examined.  The plan for anesthesia for this case was discussed.  Questions were answered and the consent was signed.  [facility] From Anesthesia Perspective.

## 2020-10-22 NOTE — HOSPITAL COURSE
Ms. Rendon underwent IVY and is s/p CTI line ablation.  She tolerated the procedure well without any acute complications. Admitted to PACU and post-procedure ECG sinus rhythm with RBBB. Right groin site intact and without evidence of hematoma. ompleted bedrest x 4 hours and ambulated without difficulty.  She was monitored on telemetry and was without any acute arrhythmias. Resumed PTA medications, including Xarelto for CVA prophylaxis x 4 weeks. Plan to stop sotalol.   Plan of care and discharge instructions discussed with Ms. Rendon and her daughter.  All questions were answered and she was discharged home in stable condition.

## 2020-10-22 NOTE — ASSESSMENT & PLAN NOTE
Ms. Rendon is s/p CTI line ablation.     Plan as follows:   - Pain relief with Tylenol 650 mg q4h PRN   - Anticoagulation: continue Xarelto 20 mg x 4 weeks, then discontinue   - Anti-arrythmic: stop Sotalol    -Protonix 40 mg daily x 4 weeks     - Follow up with Dr. Garcia in 6-8 weeks   - Discharge plans/instructions discussed with patient who verbalized understanding and agreement of plans of care. No further questions or concerns voiced at this time.

## 2020-10-22 NOTE — ASSESSMENT & PLAN NOTE
- We discussed the alternatives, benefits and risks of the procedure including pain, infection, bleeding, injury to veins and arteries including aneurysms and fistulas, injury to heart valves, puncture of the heart leading to pericardial effusion or tamponade requiring tube drainage, atrial-esophageal fistula, heart attack, migraine headaches secondary to transseptal puncture, damage to the native conduction system such that a pacemaker may be required, stroke, and death.  - Agrees to proceed as planned  - Rosalina Rendon is a 69 year old female with a history of atrial flutter. Her [id number] Score is 4 (age, female, HTN, CAD) so she should continue on Xarelto for now.  - Will continue Xarelto  - IVY--cancel if sinus.  - Post-ablation will stop Sotalol, and will plan on stopping Xarelto 4 weeks post-ablation.

## 2020-10-22 NOTE — Clinical Note
Prepped: groin. Prepped with: ChloraPrep. The site was clipped. The patient was draped. Pannus retractor used

## 2020-10-23 NOTE — ANESTHESIA POSTPROCEDURE EVALUATION
Anesthesia Post Evaluation    Patient: Rosalina Rendon    Procedure(s) Performed: Procedure(s) (LRB):  ABLATION, ATRIAL FLUTTER, TYPICAL (N/A)  ECHOCARDIOGRAM, TRANSESOPHAGEAL (N/A)    Final Anesthesia Type: general    Patient location during evaluation: PACU  Patient participation: Yes- Able to Participate  Level of consciousness: awake  Post-procedure vital signs: reviewed and stable  Pain management: adequate  Airway patency: patent    PONV status at discharge: No PONV  Anesthetic complications: no      Cardiovascular status: blood pressure returned to baseline  Respiratory status: unassisted  Hydration status: euvolemic  Follow-up not needed.          Vitals Value Taken Time   /65 10/22/20 1815   Temp 36.6 °C (97.9 °F) 10/22/20 1515   Pulse 63 10/22/20 1815   Resp 23 10/22/20 1815   SpO2 95 % 10/22/20 1645         No case tracking events are documented in the log.      Pain/Severino Score: Severino Score: 9 (10/22/2020  3:15 PM)

## 2020-10-26 ENCOUNTER — TELEPHONE (OUTPATIENT)
Dept: ELECTROPHYSIOLOGY | Facility: CLINIC | Age: 70
End: 2020-10-26

## 2020-10-26 NOTE — TELEPHONE ENCOUNTER
Spoke with pt to schedule f/u appt with GP at [facility]. Will mail appt reminder.  ----- Message from Delia Quiroz NP sent at 10/26/2020 11:04 AM CDT -----  Richy     Ms. Rendon underwent atrial flutter ablation with Dr. Garcia on 10/22/20.  She will need 3 month follow up with him when he's at [place].  Could you please assist in scheduling?    Thanks!    AIXA Garrett

## 2020-10-27 LAB — BSA FOR ECHO PROCEDURE: 2.14 M2

## 2020-11-04 ENCOUNTER — NURSE TRIAGE (OUTPATIENT)
Dept: ADMINISTRATIVE | Facility: CLINIC | Age: 70
End: 2020-11-04

## 2020-11-04 NOTE — TELEPHONE ENCOUNTER
Contacted patient on behalf of [facility]'s post procedure call back symptom tracker.  Patient's identity verified dby stating first and last names and .  Patient denies any Covid 19 symptoms since procedure

## 2021-01-21 RX ORDER — BUDESONIDE AND FORMOTEROL FUMARATE DIHYDRATE 160; 4.5 UG/1; UG/1
AEROSOL RESPIRATORY (INHALATION)
COMMUNITY
Start: 2020-01-07

## 2021-01-21 RX ORDER — IPRATROPIUM BROMIDE AND ALBUTEROL SULFATE 2.5; .5 MG/3ML; MG/3ML
SOLUTION RESPIRATORY (INHALATION)
COMMUNITY
Start: 2020-01-06

## 2021-01-22 ENCOUNTER — OFFICE VISIT (OUTPATIENT)
Dept: CARDIOLOGY | Facility: CLINIC | Age: 71
End: 2021-01-22
Payer: MEDICARE

## 2021-01-22 DIAGNOSIS — E78.5 HYPERLIPIDEMIA, UNSPECIFIED HYPERLIPIDEMIA TYPE: ICD-10-CM

## 2021-01-22 DIAGNOSIS — I48.92 ATRIAL FLUTTER, UNSPECIFIED TYPE: Primary | ICD-10-CM

## 2021-01-22 DIAGNOSIS — I10 ESSENTIAL HYPERTENSION: ICD-10-CM

## 2021-01-22 DIAGNOSIS — Z79.899 ENCOUNTER FOR MONITORING SOTALOL THERAPY: ICD-10-CM

## 2021-01-22 DIAGNOSIS — Z51.81 ENCOUNTER FOR MONITORING SOTALOL THERAPY: ICD-10-CM

## 2021-01-22 PROCEDURE — 99214 OFFICE O/P EST MOD 30 MIN: CPT | Mod: ,,, | Performed by: INTERNAL MEDICINE

## 2021-01-22 PROCEDURE — 99214 PR OFFICE/OUTPT VISIT, EST, LEVL IV, 30-39 MIN: ICD-10-PCS | Mod: ,,, | Performed by: INTERNAL MEDICINE

## (undated) DEVICE — SYS PANNUS RETENTION

## (undated) DEVICE — CATH BIDIRECTIONAL DF CRV 7FR

## (undated) DEVICE — PAD DEFIB CADENCE ADULT R2

## (undated) DEVICE — ELECTRODE POLYHESIVEPRE-ATTACH

## (undated) DEVICE — INTRO 8.5FR 63CM SRO

## (undated) DEVICE — CATH DS NAV THERMOCOUPLE 7FR

## (undated) DEVICE — PATCH CARTO REFERENCE

## (undated) DEVICE — SHEATH HEMOSTASIS 8.5FR

## (undated) DEVICE — INTRODUCER HEMOSTASIS 7.5F

## (undated) DEVICE — PACK EP DRAPE

## (undated) DEVICE — CATH TRICUSPID HALO XP 7FRX110